# Patient Record
Sex: FEMALE | Race: WHITE | NOT HISPANIC OR LATINO | Employment: UNEMPLOYED | URBAN - METROPOLITAN AREA
[De-identification: names, ages, dates, MRNs, and addresses within clinical notes are randomized per-mention and may not be internally consistent; named-entity substitution may affect disease eponyms.]

---

## 2020-11-11 ENCOUNTER — OFFICE VISIT - HEALTHEAST (OUTPATIENT)
Dept: FAMILY MEDICINE | Facility: CLINIC | Age: 35
End: 2020-11-11

## 2020-11-11 ENCOUNTER — COMMUNICATION - HEALTHEAST (OUTPATIENT)
Dept: ADMINISTRATIVE | Facility: HOSPITAL | Age: 35
End: 2020-11-11

## 2020-11-11 DIAGNOSIS — N63.0 LUMP OR MASS IN BREAST: ICD-10-CM

## 2020-11-11 DIAGNOSIS — L73.2 HIDRADENITIS SUPPURATIVA: ICD-10-CM

## 2020-11-11 DIAGNOSIS — F17.200 CURRENT EVERY DAY SMOKER: ICD-10-CM

## 2020-11-11 DIAGNOSIS — Z84.81 FAMILY HISTORY OF BRCA GENE MUTATION: ICD-10-CM

## 2020-11-11 DIAGNOSIS — Z23 NEED FOR IMMUNIZATION AGAINST INFLUENZA: ICD-10-CM

## 2020-11-11 DIAGNOSIS — E66.3 OVERWEIGHT (BMI 25.0-29.9): ICD-10-CM

## 2020-11-11 ASSESSMENT — MIFFLIN-ST. JEOR: SCORE: 1459.52

## 2021-06-05 VITALS
HEART RATE: 82 BPM | BODY MASS INDEX: 29.15 KG/M2 | HEIGHT: 64 IN | DIASTOLIC BLOOD PRESSURE: 82 MMHG | SYSTOLIC BLOOD PRESSURE: 100 MMHG | OXYGEN SATURATION: 98 % | TEMPERATURE: 98 F | RESPIRATION RATE: 16 BRPM | WEIGHT: 170.75 LBS

## 2021-06-13 NOTE — PROGRESS NOTES
"DOYLE Kelly is a 35 y.o. female here to establish care and to discuss her breast cancer risk. She just learned that her aunt who had breast cancer has BRCA gene. Her aunt is 53. Jazmine's Mom  age 59, was diagnosed age 56 with breast cancer. She was never tested for BRCA.  Her grandmother also had breast cancer. No family history of ovarian or colon cancer.     Just got  in 2019 and states her wife has been \"on my case\" about going to the doctor.  Their marriage has been going well.  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      Still wanted to ask about 2 cystlike areas, one under each breast.  She also noticed 1 in her lower abdomen.  They seem like a pimple that popped.  These just came within the last month.      She has regular monthly periods.  She is in the middle of her cycle right now.    She does think she has put on some weight during the past year or so.  She and her wife have started going to the gym in their apartment building 3 times per week.     She smokes 1/2 ppd or a little less.  She has quit at various times, but has ended up coming back to it because it is a stress reliever for her.  She has worked in the restaurant industry for 20 years and has always gotten used to taking smoke breaks.     Past Medical History:   Diagnosis Date     Tobacco dependence      No current outpatient medications on file prior to visit.     No current facility-administered medications on file prior to visit.        Past medical, surgical, family and social history reviewed.  ?  ROS:  A complete 10 point ROS was negative for that other than the above.   ?  O  /82   Pulse 82   Temp 98  F (36.7  C) (Oral)   Resp 16   Ht 5' 4\" (1.626 m)   Wt 170 lb 12 oz (77.5 kg)   LMP 10/28/2020 (Approximate) Comment: 2 weeks ago  SpO2 98% Comment: ra  Breastfeeding No   BMI 29.31 kg/m     Vitals reviewed. Nursing note reviewed.  General Appearance: Pleasant and alert, " "in no acute distress  HEENT: mucous membranes moist  CV: RRR, no murmur, rubs, gallops  Resp: No respiratory distress. Clear to auscultation bilaterally. No wheezes, rales, rhonchi  Breast: left breast feels soft with no lumps, no nipple discharge. On right breast, there is a lumpy area at 10:00 that is slightly tender on palpation.    Abd: Soft, nontender, nondistended, bowel sounds present. No masses.  Ext: No peripheral edema, good distal perfusion  Skin: cystlike structures under each breast and one on left side of bottom of abdomen. No warmth, erythema or fluctuance. All are open, appear to have \"popped.\"  Neuro: no focal deficits, CNs II-XII normal.   Psych: mood and affect are normal.    A/P  Jazmine was seen today for cancer.    Diagnoses and all orders for this visit:    Family history of BRCA gene mutation  -     Ambulatory referral to Genetics    Lump or mass in breast: will order ultrasound given this finding.  Explained that since she is mid-cycle, she could be ovulating which could cause some breast tenderness, and the \"lumpy\" area may be benign, but especially considering her family history, we need to further evaluate  -     US Breast Right Limited 1-3 Quadrants; Future      Current every day smoker: discussed whether she is interested in quitting. She is not ready at this point.  I discussed options for Chantix, Zyban, nicotine replacement therapy.  I recommended that when she decides to quit, it would be best if she and her wife can quit together.  She states that she would like to try cold turkey again before trying anything to help her quit.    Need for immunization against influenza  -     Influenza, Seasonal Quad, PF =/> 6months      Overweight (BMI 25.0-29.9): Congratulated her on her efforts to start exercising more regularly.  Encouraged her to keep up these healthy habits and explained that losing weight would decrease her risk of breast cancer and help improve her " "hidradenitis.    Hidradenitis suppurativa: discussed diagnosis and explained that weight loss and quitting smoking would help with this.  Recommended that if she does get an area that is very red or tender, she should come in to be seen since this could be infected, and she should not \"pop\" or drain the area herself.  Advised using warm compresses to help take care of these areas.     I will follow up with her after the  Genetic testing results and the ulrasound.     Return in about 1 year (around 11/11/2021) for Annual physical.      Options for treatment and follow-up care were reviewed with the patient and/or guardian. Jazmine SWANSON Leticia and/or guardian engaged in the decision making process and verbalized understanding of the options discussed and agreed with the final plan.    Shyanne Garcia MD      "

## 2021-06-13 NOTE — PATIENT INSTRUCTIONS - HE
Patient Education     Genetic Counseling: [facility]-  [phone]    Ultrasound:   [phone].     Talk to your wife about quitting smoking and let me know if you'd like to try something to quit.     Understanding Hidradenitis Suppurativa  Hidradenitis suppurativa is a long-term (chronic) skin disease. It causes painful bumps and sores (abscesses) to form around a hair follicle. Follicles are the tiny holes from which hair grows out of your skin. The disease occurs on parts of the body where skin rubs together. It most often appears in the armpits, the groin area, and under the breasts. It's more common in women.   How to say it  [id number]  What causes hidradenitis suppurativa?  This skin disease happens when hair follicles become clogged with keratin. Keratin is the protein that makes up your hair and nails. The follicles then burst and become inflamed . Pressure or rubbing on the skin can clog the follicles. Or it can further irritate them.  The disease tends to run in families. It s also more likely to occur in people who are obese, have diabetes, or smoke. Hormones or the immune system may also play a part.  Symptoms of hidradenitis suppurativa  This skin disease causes one or more painful red bumps on the skin. These bumps become inflamed and drain pus. They may also itch or burn. In severe cases, sinus tracts may form. These are narrow channels that run under the skin. Blood or a bad-smelling pus may ooze from these bumps or sinus tracts. Bands of scarring often occur.  Treatment for hidradenitis suppurativa  Treatment for this skin disease is most successful when started early. But it may be hard to diagnose. It may be mistaken for other skin conditions. The painful bumps also often return. So stopping new bumps and limiting scarring is important. Treatment options include:    Warm compress. Putting a warm, wet washcloth on the affected skin may help.    Lifestyle  changes. Your symptoms may get better if you lose weight or stop smoking, if needed. Also avoid shaving or other irritants, such as deodorant or perfume.    Antibiotics. For mild cases, an antibiotic for the skin (topical) may help. You may need oral antibiotics if you have a severe case. They can help prevent further infection.    Other oral medicines. Over-the-counter pain medicines can ease pain and inflammation. You may need stronger medicines for a severe case. These medicines include corticosteroids or a retinoid. These may cause side effects.    Injected medicines. A steroid may be injected into the bump to ease pain. A newer biologic medicine may be injected to ease severe symptoms.    Surgery. Surgery can drain and remove the painful bumps. For severe cases, the doctor may cut out the entire area of affected skin or destroy it with a laser.  Possible complications of hidradenitis suppurativa  These include:    Arthritis    Depression    Lymphedema    Scarring of skin    Skin cancer  When to call your healthcare provider  Call your healthcare provider right away if you have any of these:    Fever of 100.4 F (38 C) or higher, or as directed by your healthcare provider    Redness, swelling, or fluid leaking from your rash that gets worse    Pain that gets worse    Symptoms that don t get better, or get worse    New symptoms  Date Last Reviewed: 5/1/2016 2000-2019 [employer]. [address] [place] [address], [place] [address]. All rights reserved. This information is not intended as a substitute for professional medical care. Always follow your healthcare professional's instructions.

## 2021-06-16 PROBLEM — F17.200 CURRENT EVERY DAY SMOKER: Status: ACTIVE | Noted: 2020-11-11

## 2021-06-16 PROBLEM — L73.2 HIDRADENITIS SUPPURATIVA: Status: ACTIVE | Noted: 2020-11-11

## 2021-06-16 PROBLEM — Z84.81 FAMILY HISTORY OF BRCA GENE MUTATION: Status: ACTIVE | Noted: 2020-11-11

## 2021-06-16 PROBLEM — E66.3 OVERWEIGHT (BMI 25.0-29.9): Status: ACTIVE | Noted: 2020-11-11

## 2022-11-01 DIAGNOSIS — F32.1 MODERATE MAJOR DEPRESSION (H): ICD-10-CM

## 2022-11-01 DIAGNOSIS — F41.1 GENERALIZED ANXIETY DISORDER: Primary | ICD-10-CM

## 2022-12-18 ENCOUNTER — APPOINTMENT (OUTPATIENT)
Dept: GENERAL RADIOLOGY | Facility: HOSPITAL | Age: 37
End: 2022-12-18
Attending: EMERGENCY MEDICINE
Payer: COMMERCIAL

## 2022-12-18 ENCOUNTER — HOSPITAL ENCOUNTER (EMERGENCY)
Facility: HOSPITAL | Age: 37
Discharge: HOME | End: 2022-12-19
Attending: EMERGENCY MEDICINE | Admitting: EMERGENCY MEDICINE
Payer: COMMERCIAL

## 2022-12-18 DIAGNOSIS — S43.005A SHOULDER DISLOCATION, LEFT, INITIAL ENCOUNTER: ICD-10-CM

## 2022-12-18 PROCEDURE — 73030 X-RAY EXAM OF SHOULDER: CPT | Mod: LT

## 2022-12-18 PROCEDURE — 250N000011 HC RX IP 250 OP 636: Performed by: EMERGENCY MEDICINE

## 2022-12-18 PROCEDURE — 250N000013 HC RX MED GY IP 250 OP 250 PS 637: Performed by: EMERGENCY MEDICINE

## 2022-12-18 PROCEDURE — 20610 DRAIN/INJ JOINT/BURSA W/O US: CPT

## 2022-12-18 PROCEDURE — 99285 EMERGENCY DEPT VISIT HI MDM: CPT | Mod: 25

## 2022-12-18 PROCEDURE — 20610 DRAIN/INJ JOINT/BURSA W/O US: CPT | Performed by: EMERGENCY MEDICINE

## 2022-12-18 RX ORDER — MORPHINE SULFATE 4 MG/ML
4 INJECTION, SOLUTION INTRAMUSCULAR; INTRAVENOUS ONCE
Status: DISCONTINUED | OUTPATIENT
Start: 2022-12-18 | End: 2022-12-18

## 2022-12-18 RX ORDER — OXYCODONE HYDROCHLORIDE 5 MG/1
5 TABLET ORAL ONCE
Status: COMPLETED | OUTPATIENT
Start: 2022-12-18 | End: 2022-12-18

## 2022-12-18 RX ORDER — ACETAMINOPHEN 325 MG/1
975 TABLET ORAL ONCE
Status: COMPLETED | OUTPATIENT
Start: 2022-12-18 | End: 2022-12-18

## 2022-12-18 RX ORDER — LORAZEPAM 2 MG/ML
1 INJECTION INTRAMUSCULAR ONCE
Status: COMPLETED | OUTPATIENT
Start: 2022-12-18 | End: 2022-12-18

## 2022-12-18 RX ADMIN — LORAZEPAM 1 MG: 2 INJECTION INTRAMUSCULAR; INTRAVENOUS at 23:51

## 2022-12-18 RX ADMIN — OXYCODONE HYDROCHLORIDE 5 MG: 5 TABLET ORAL at 23:07

## 2022-12-18 RX ADMIN — ACETAMINOPHEN 975 MG: 325 TABLET, FILM COATED ORAL at 23:07

## 2022-12-18 ASSESSMENT — ACTIVITIES OF DAILY LIVING (ADL): ADLS_ACUITY_SCORE: 33

## 2022-12-19 ENCOUNTER — APPOINTMENT (OUTPATIENT)
Dept: GENERAL RADIOLOGY | Facility: HOSPITAL | Age: 37
End: 2022-12-19
Attending: EMERGENCY MEDICINE
Payer: COMMERCIAL

## 2022-12-19 VITALS
OXYGEN SATURATION: 97 % | RESPIRATION RATE: 16 BRPM | DIASTOLIC BLOOD PRESSURE: 91 MMHG | HEART RATE: 77 BPM | TEMPERATURE: 98.1 F | SYSTOLIC BLOOD PRESSURE: 119 MMHG

## 2022-12-19 PROCEDURE — 999N000065 XR SHOULDER LEFT 2 VIEWS: Mod: LT

## 2022-12-19 ASSESSMENT — ACTIVITIES OF DAILY LIVING (ADL): ADLS_ACUITY_SCORE: 35

## 2022-12-19 NOTE — ED PROVIDER NOTES
History     Chief Complaint   Patient presents with     Shoulder Pain     HPI  Jazmine Kelly is a 37 year old female who presents with L shoulder pain. She was stretching and felt her L shoulder dislocate. This has happened before but she has always been able to get it back in place. She has some tingling in her hand. She reports severe pain with movement. No other injuries. She has had surgery on her R shoulder.     Allergies:  No Known Allergies    Problem List:    Patient Active Problem List    Diagnosis Date Noted     Family history of BRCA gene mutation 11/11/2020     Priority: Medium     Current every day smoker 11/11/2020     Priority: Medium     Overweight (BMI 25.0-29.9) 11/11/2020     Priority: Medium     Hidradenitis suppurativa 11/11/2020     Priority: Medium        Past Medical History:    Past Medical History:   Diagnosis Date     Tobacco dependence        Past Surgical History:    Past Surgical History:   Procedure Laterality Date     NO PAST SURGERIES         Family History:    Family History   Problem Relation Age of Onset     Breast Cancer Mother      No Known Problems Father      Breast Cancer Maternal Grandmother      Prostate Cancer Maternal Grandfather      Breast Cancer Maternal Aunt        Social History:  Marital Status:   [2]  Social History     Tobacco Use     Smoking status: Every Day     Types: Cigarettes     Smokeless tobacco: Never     Tobacco comments:     half pack per day   Substance Use Topics     Alcohol use: Yes     Drug use: Never        Medications:    No current outpatient medications on file.        Review of Systems  Please seen HPI for pertinent positives and negatives. All other systems reviewed and found to be negative.   Physical Exam   BP: 135/90  Pulse: 117  Temp: 98.1  F (36.7  C)  Resp: 18  SpO2: 98 %      Physical Exam  Constitutional:       General: She is in acute distress.   HENT:      Head: Normocephalic and atraumatic.      Nose: Nose normal.       Mouth/Throat:      Mouth: Mucous membranes are moist.      Pharynx: Oropharynx is clear.   Eyes:      Conjunctiva/sclera: Conjunctivae normal.      Pupils: Pupils are equal, round, and reactive to light.   Cardiovascular:      Rate and Rhythm: Normal rate and regular rhythm.      Comments: +2 radial and ulnar pulses BL. Fingers warm and well perfused with <2sec cap refill  Pulmonary:      Effort: Pulmonary effort is normal.      Breath sounds: Normal breath sounds.   Musculoskeletal:      Cervical back: Normal range of motion.      Comments:  strength and pincer grasp intact   Skin:     General: Skin is warm and dry.   Neurological:      Mental Status: She is alert and oriented to person, place, and time.      Comments: Sensation intact BL hands/fingers         ED Course              ED Course as of 12/19/22 2327   Sun Dec 18, 2022   2359 Shoulder may have spontaneously reduced. Will repeat xray     Arthrocentesis    Date/Time: 12/19/2022 11:24 PM  Performed by: Anastasiya White MD  Authorized by: Anastasiya White MD     Risks, benefits and alternatives discussed.      LOCATION:   Location:  Shoulder  ANESTHESIA (see MAR for exact dosages):   Anesthesia method:  Local infiltration  Local anesthetic:  Lidocaine 1% w/o epi      PROCEDURE DETAILS:    Needle gauge: 27 gauge.    Ultrasound guidance: no      Approach:  Posterior    Aspirate amount:  N/a    Steroid injected: no  Dressing:      PROCEDURE  Describe Procedure: Posterior approach shoulder joint injection for dislocated shoulder. 10 ml lidocaine without epinephrine injected with improvement of pain  Patient Tolerance:  Patient tolerated the procedure well with no immediate complications                Critical Care time:  none               Results for orders placed or performed during the hospital encounter of 12/18/22 (from the past 24 hour(s))   XR Shoulder Left 2 Views    Narrative    Exam: XR SHOULDER LEFT 2 VIEWS    Technique: Left shoulder, 2  views    Comparison: 12/18/2022    Exam reason: post-reduction dislocation    Findings:  Interval reduction of the left shoulder dislocation. No acute fracture  is demonstrated.    Soft tissues are normal.      Impression    Impression:  Interval reduction of the left shoulder dislocation.    ELISSA GILLESPIE MD         SYSTEM ID:  [id number]       Medications   oxyCODONE (ROXICODONE) tablet 5 mg (5 mg Oral Given 12/18/22 2307)   acetaminophen (TYLENOL) tablet 975 mg (975 mg Oral Given 12/18/22 2307)   LORazepam (ATIVAN) injection 1 mg (1 mg Intravenous Given 12/18/22 2351)       Assessments & Plan (with Medical Decision Making)     I have reviewed the nursing notes.    I have reviewed the findings, diagnosis, plan and need for follow up with the patient.   Ms Kelly is a 37-year-old woman who presents to the ED with left shoulder dislocation.  She is in significant distress, was given oxycodone, Tylenol, and then Valium IV.  I performed a joint injection and then attempted to manipulate the joint but she was unable to tolerate this.  However after a few minutes her joint spontaneously reduced.  This was confirmed on x-ray.  She was able to touch her opposite shoulder and had significant improvement of pain.  No apparent fracture on postreduction.  She was placed in a sling, recommended ibuprofen and Tylenol, close follow-up with orthopedics.  Return precautions discussed as detailed in AVS. Patient expressed understanding.     There are no discharge medications for this patient.      Final diagnoses:   Shoulder dislocation, left, initial encounter       12/18/2022   [facility]     Anastasiya White MD  12/19/22 8009

## 2022-12-19 NOTE — ED NOTES
"Patient given verbal and written discharge instructions. Patient verbalized understanding of discharge instructions. Sling placed to left arm. CMS pre and post sling intact. Skin warm. Left radial pulse present. Pt rates left shoulder pain 2/10 \"irritated. Pt's friend driving her home. Pt to follow up with orthopedics this week.     "

## 2022-12-19 NOTE — ED TRIAGE NOTES
Patient presents to emergency room with c/o left shoulder pain. Pt reports she was stretching on the couch and felt her left shoulder displace. Hx of shoulder dislocation. Last shoulder dislocation was a couple months ago. Rates left shoulder pain 9/10. Crying. Left radial pulse present. C/o numbness and tingling to left hand.

## 2022-12-19 NOTE — DISCHARGE INSTRUCTIONS
Ibuprofen 600 mg every 6 hours as needed for pain with food and plenty of water. Discontinue use after 5 days.   Tylenol 650 mg every 6 hours as needed for pain.  Do not take more than 3250 mg per day  Keep sling on at all times while you are up during the day.  Remove the sling and prop your arm with pillows at night.  Keep your arm close to her body with your hand across your trunk.  After the first 24 hours take your arm out of your sling once every hour and perform 10 gentle pendulum swings of the shoulder, 10 flexion/extension exercises of the elbow, and rotate the wrist 10 times in both directions.  This is to prevent joint stiffness/frozen shoulder  Apply ice wrapped in a towel to painful areas for 10 minutes 5-6 times/day  Follow up with orthopedics in 1 week.  Wear your sling until then.  Return to the emergency department for worsening pain, weakness/numbness, swelling/discoloration, or if you have any new or changing symptoms/concerns

## 2022-12-19 NOTE — ED NOTES
Pt reports a decrease in pain after PO Oxycodone and nerve block. Pt no longer crying. Pt reports that her left shoulder may have moved back into place while she was crying and accidentally jerked her left arm.

## 2022-12-19 NOTE — ED NOTES
Pt rates left shoulder pain 2/10. No longer crying or holding left arm. Appears relaxed and comfortable. Denies left hand numbness at this time.

## 2023-03-13 ENCOUNTER — HOSPITAL ENCOUNTER (EMERGENCY)
Facility: HOSPITAL | Age: 38
Discharge: HOME | End: 2023-03-13
Payer: COMMERCIAL

## 2023-03-13 VITALS
TEMPERATURE: 99.2 F | DIASTOLIC BLOOD PRESSURE: 69 MMHG | OXYGEN SATURATION: 97 % | RESPIRATION RATE: 18 BRPM | HEART RATE: 96 BPM | SYSTOLIC BLOOD PRESSURE: 109 MMHG

## 2023-03-13 DIAGNOSIS — J02.0 STREPTOCOCCAL PHARYNGITIS: ICD-10-CM

## 2023-03-13 DIAGNOSIS — F17.200 CURRENT EVERY DAY SMOKER: ICD-10-CM

## 2023-03-13 LAB — GROUP A STREP BY PCR: DETECTED

## 2023-03-13 PROCEDURE — 87651 STREP A DNA AMP PROBE: CPT

## 2023-03-13 PROCEDURE — 96372 THER/PROPH/DIAG INJ SC/IM: CPT

## 2023-03-13 PROCEDURE — G0463 HOSPITAL OUTPT CLINIC VISIT: HCPCS | Mod: 25

## 2023-03-13 PROCEDURE — 99213 OFFICE O/P EST LOW 20 MIN: CPT

## 2023-03-13 PROCEDURE — 250N000011 HC RX IP 250 OP 636

## 2023-03-13 RX ORDER — LAMOTRIGINE 100 MG/1
1 TABLET ORAL
COMMUNITY
Start: 2023-03-06

## 2023-03-13 RX ORDER — ESCITALOPRAM OXALATE 20 MG/1
1 TABLET ORAL
COMMUNITY
Start: 2023-03-06

## 2023-03-13 RX ADMIN — PENICILLIN G BENZATHINE 1.2 MILLION UNITS: 1200000 INJECTION, SUSPENSION INTRAMUSCULAR at 17:05

## 2023-03-13 ASSESSMENT — ENCOUNTER SYMPTOMS
SHORTNESS OF BREATH: 0
FEVER: 0
DIARRHEA: 0
VOMITING: 0
NAUSEA: 0
ACTIVITY CHANGE: 0
COUGH: 0
RHINORRHEA: 0
APPETITE CHANGE: 0
SORE THROAT: 1
CHILLS: 0
ABDOMINAL PAIN: 0

## 2023-03-13 ASSESSMENT — ACTIVITIES OF DAILY LIVING (ADL): ADLS_ACUITY_SCORE: 35

## 2023-03-13 NOTE — DISCHARGE INSTRUCTIONS
Tylenol 1000mg every 6 hours with a max dose of 4000mg daily along with ibuprofen 600-800mg every 8 hours.     Warm salt water gargles. You can also try cool liquids and teas with honey.    Return with any concerns

## 2023-03-13 NOTE — ED PROVIDER NOTES
History     Chief Complaint   Patient presents with     Pharyngitis     HPI  Jazmine Kelly is a 37 year old female who presents to the urgent care with a three day history of a sore throat. She denies n/v/d, abd pain, ear pain, and decreased appetite. No OTC meds taken today. She is a smoker. Works as a .     Allergies:  Allergies   Allergen Reactions     Lactose        Problem List:    Patient Active Problem List    Diagnosis Date Noted     Family history of BRCA gene mutation 11/11/2020     Priority: Medium     Current every day smoker 11/11/2020     Priority: Medium     Overweight (BMI 25.0-29.9) 11/11/2020     Priority: Medium     Hidradenitis suppurativa 11/11/2020     Priority: Medium        Past Medical History:    Past Medical History:   Diagnosis Date     Tobacco dependence        Past Surgical History:    Past Surgical History:   Procedure Laterality Date     NO PAST SURGERIES         Family History:    Family History   Problem Relation Age of Onset     Breast Cancer Mother      No Known Problems Father      Breast Cancer Maternal Grandmother      Prostate Cancer Maternal Grandfather      Breast Cancer Maternal Aunt        Social History:  Marital Status:   [2]  Social History     Tobacco Use     Smoking status: Every Day     Types: Cigarettes     Smokeless tobacco: Never     Tobacco comments:     half pack per day   Substance Use Topics     Alcohol use: Yes     Drug use: Never        Medications:    escitalopram (LEXAPRO) 20 MG tablet  lamoTRIgine (LAMICTAL) 100 MG tablet          Review of Systems   Constitutional: Negative for activity change, appetite change, chills and fever.   HENT: Positive for sore throat. Negative for congestion, ear pain and rhinorrhea.    Respiratory: Negative for cough and shortness of breath.    Gastrointestinal: Negative for abdominal pain, diarrhea, nausea and vomiting.   All other systems reviewed and are negative.      Physical Exam   BP: 109/69  Pulse:  96  Temp: 99.2  F (37.3  C)  Resp: 18  SpO2: 97 %      Physical Exam  Vitals and nursing note reviewed.   Constitutional:       General: She is not in acute distress.     Appearance: She is well-developed and normal weight. She is not ill-appearing.   HENT:      Right Ear: Tympanic membrane normal. No middle ear effusion. No mastoid tenderness. Tympanic membrane is not perforated or erythematous.      Left Ear: Tympanic membrane normal.  No middle ear effusion. No mastoid tenderness. Tympanic membrane is not perforated or erythematous.      Nose: No congestion or rhinorrhea.      Mouth/Throat:      Mouth: Mucous membranes are moist.      Pharynx: Uvula midline. Oropharyngeal exudate and posterior oropharyngeal erythema present. No uvula swelling.   Cardiovascular:      Rate and Rhythm: Normal rate and regular rhythm.      Pulses: Normal pulses.      Heart sounds: Normal heart sounds, S1 normal and S2 normal. No murmur heard.  Pulmonary:      Effort: Pulmonary effort is normal. No tachypnea, bradypnea or respiratory distress.      Breath sounds: Normal breath sounds. No stridor. No wheezing or rhonchi.   Lymphadenopathy:      Head:      Right side of head: Submandibular and tonsillar adenopathy present.      Left side of head: Submandibular and tonsillar adenopathy present.      Cervical: Cervical adenopathy present.      Right cervical: No superficial cervical adenopathy.     Left cervical: No superficial cervical adenopathy.   Skin:     General: Skin is warm and dry.   Neurological:      Mental Status: She is alert.         ED Course                 Procedures                Results for orders placed or performed during the hospital encounter of 03/13/23 (from the past 24 hour(s))   Group A Streptococcus PCR Throat Swab    Specimen: Throat; Swab   Result Value Ref Range    Group A strep by PCR Detected (A) Not Detected    Narrative    The Xpert Xpress Strep A test, performed on the [employer], is  a rapid, qualitative in vitro diagnostic test for the detection of Streptococcus pyogenes (Group A ß-hemolytic Streptococcus, Strep A) in throat swab specimens from patients with signs and symptoms of pharyngitis. The Xpert Xpress Strep A test can be used as an aid in the diagnosis of Group A Streptococcal pharyngitis. The assay is not intended to monitor treatment for Group A Streptococcus infections. The Xpert Xpress Strep A test utilizes an automated real-time polymerase chain reaction (PCR) to detect Streptococcus pyogenes DNA.       Medications   penicillin G benzathine (BICILLIN L-A) injection 1.2 Million Units (1.2 Million Units Intramuscular $Given 3/13/23 1709)       Assessments & Plan (with Medical Decision Making)     I have reviewed the nursing notes.    I have reviewed the findings, diagnosis, plan and need for follow up with the patient.    Jazmine Kelly is a 37 year old female who presents to the urgent care with a three day history of a sore throat. She denies n/v/d, abd pain, ear pain, and decreased appetite. No OTC meds taken today. She is a smoker. Works as a .     (J02.0) Streptococcal pharyngitis  Plan: bicillin injection given in UC. Tylenol and ibuprofen as needed for pain. Warm salt water gargles. Follow up with PCP as needed. Return with any concerns. Understanding verbalized.     MDM: Strep positive. No recent abx. Bicillin injection given.  VSS and afebrile. Lungs clear, heart tones regular. She is eating and drinking.         New Prescriptions    No medications on file       Final diagnoses:   Streptococcal pharyngitis       3/13/2023   [facility]     Samina Sargent NP  03/13/23 9395

## 2023-04-13 ENCOUNTER — APPOINTMENT (OUTPATIENT)
Dept: CT IMAGING | Facility: HOSPITAL | Age: 38
End: 2023-04-13
Attending: INTERNAL MEDICINE

## 2023-04-13 ENCOUNTER — APPOINTMENT (OUTPATIENT)
Dept: ULTRASOUND IMAGING | Facility: HOSPITAL | Age: 38
End: 2023-04-13
Attending: INTERNAL MEDICINE

## 2023-04-13 ENCOUNTER — HOSPITAL ENCOUNTER (EMERGENCY)
Facility: HOSPITAL | Age: 38
Discharge: HOME | End: 2023-04-13
Attending: INTERNAL MEDICINE | Admitting: INTERNAL MEDICINE

## 2023-04-13 VITALS
RESPIRATION RATE: 18 BRPM | WEIGHT: 167.99 LBS | DIASTOLIC BLOOD PRESSURE: 71 MMHG | TEMPERATURE: 96.3 F | BODY MASS INDEX: 28.84 KG/M2 | HEART RATE: 104 BPM | SYSTOLIC BLOOD PRESSURE: 120 MMHG | OXYGEN SATURATION: 95 %

## 2023-04-13 DIAGNOSIS — S02.2XXA CLOSED FRACTURE OF NASAL BONE, INITIAL ENCOUNTER: ICD-10-CM

## 2023-04-13 DIAGNOSIS — S10.93XA CONTUSION OF FACE, SCALP AND NECK, INITIAL ENCOUNTER: ICD-10-CM

## 2023-04-13 DIAGNOSIS — S00.83XA CONTUSION OF FACE, SCALP AND NECK, INITIAL ENCOUNTER: ICD-10-CM

## 2023-04-13 DIAGNOSIS — S00.03XA CONTUSION OF FACE, SCALP AND NECK, INITIAL ENCOUNTER: ICD-10-CM

## 2023-04-13 DIAGNOSIS — S09.90XA INJURY OF HEAD, INITIAL ENCOUNTER: ICD-10-CM
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
## 2023-04-13 PROCEDURE — 70450 CT HEAD/BRAIN W/O DYE: CPT

## 2023-04-13 PROCEDURE — 250N000013 HC RX MED GY IP 250 OP 250 PS 637: Performed by: INTERNAL MEDICINE

## 2023-04-13 PROCEDURE — 70486 CT MAXILLOFACIAL W/O DYE: CPT

## 2023-04-13 PROCEDURE — 99284 EMERGENCY DEPT VISIT MOD MDM: CPT | Mod: 25

## 2023-04-13 PROCEDURE — 99283 EMERGENCY DEPT VISIT LOW MDM: CPT | Performed by: INTERNAL MEDICINE

## 2023-04-13 RX ORDER — OXYCODONE HYDROCHLORIDE 5 MG/1
5 TABLET ORAL ONCE
Status: COMPLETED | OUTPATIENT
Start: 2023-04-13 | End: 2023-04-13

## 2023-04-13 RX ORDER — TRAZODONE HYDROCHLORIDE 50 MG/1
TABLET, FILM COATED ORAL
COMMUNITY
Start: 2023-03-06

## 2023-04-13 RX ORDER — LAMOTRIGINE 25 MG/1
TABLET ORAL
COMMUNITY
Start: 2022-11-01 | End: 2023-04-13

## 2023-04-13 RX ORDER — IBUPROFEN 600 MG/1
600 TABLET, FILM COATED ORAL ONCE
Status: COMPLETED | OUTPATIENT
Start: 2023-04-13 | End: 2023-04-13

## 2023-04-13 RX ADMIN — OXYCODONE HYDROCHLORIDE 5 MG: 5 TABLET ORAL at 05:20

## 2023-04-13 RX ADMIN — IBUPROFEN 600 MG: 600 TABLET ORAL at 04:32

## 2023-04-13 ASSESSMENT — ENCOUNTER SYMPTOMS
ANAL BLEEDING: 0
FACIAL SWELLING: 1
VOICE CHANGE: 0
PALPITATIONS: 0
FLANK PAIN: 0
NUMBNESS: 0
BLOOD IN STOOL: 0
WHEEZING: 0
NECK PAIN: 0
CHILLS: 0
ARTHRALGIAS: 0
BACK PAIN: 0
FEVER: 0
CHEST TIGHTNESS: 0
CONFUSION: 0
NECK STIFFNESS: 0
DIAPHORESIS: 0
NAUSEA: 0
COUGH: 0
VOMITING: 0
HEMATURIA: 0
LIGHT-HEADEDNESS: 0
MYALGIAS: 0
HEADACHES: 0
DIZZINESS: 0
COLOR CHANGE: 0
DYSURIA: 0
ABDOMINAL PAIN: 0
SHORTNESS OF BREATH: 0
WOUND: 0
ABDOMINAL DISTENTION: 0

## 2023-04-13 ASSESSMENT — ACTIVITIES OF DAILY LIVING (ADL): ADLS_ACUITY_SCORE: 35
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
## 2023-04-13 NOTE — ED NOTES
[facility] at bedside to speak with patient. Lab notified of need for blood draw per warrant.
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
## 2023-04-13 NOTE — ED NOTES
Discharge instructions given to patient, no questions at this time.   Encouraged patient to follow-up with her PCP &/or ENT for nasal Fx.   Verbalized understanding.   Patient ambulated out of ED to call for a ride home.

## 2024-04-08 ENCOUNTER — HOSPITAL ENCOUNTER (EMERGENCY)
Facility: HOSPITAL | Age: 39
Discharge: HOME | End: 2024-04-08
Attending: INTERNAL MEDICINE | Admitting: INTERNAL MEDICINE
Payer: COMMERCIAL

## 2024-04-08 VITALS
HEART RATE: 76 BPM | DIASTOLIC BLOOD PRESSURE: 75 MMHG | WEIGHT: 145 LBS | SYSTOLIC BLOOD PRESSURE: 119 MMHG | RESPIRATION RATE: 16 BRPM | BODY MASS INDEX: 24.75 KG/M2 | TEMPERATURE: 98.3 F | OXYGEN SATURATION: 99 % | HEIGHT: 64 IN

## 2024-04-08 DIAGNOSIS — R51.9 NONINTRACTABLE HEADACHE, UNSPECIFIED CHRONICITY PATTERN, UNSPECIFIED HEADACHE TYPE: ICD-10-CM

## 2024-04-08 LAB
ALBUMIN SERPL BCG-MCNC: 4.2 G/DL (ref 3.5–5.2)
ALP SERPL-CCNC: 49 U/L (ref 40–150)
ALT SERPL W P-5'-P-CCNC: 12 U/L (ref 0–50)
ANION GAP SERPL CALCULATED.3IONS-SCNC: 10 MMOL/L (ref 7–15)
AST SERPL W P-5'-P-CCNC: 16 U/L (ref 0–45)
BASOPHILS # BLD AUTO: 0.1 10E3/UL (ref 0–0.2)
BASOPHILS NFR BLD AUTO: 1 %
BILIRUB SERPL-MCNC: 0.7 MG/DL
BUN SERPL-MCNC: 7.7 MG/DL (ref 6–20)
CALCIUM SERPL-MCNC: 8.9 MG/DL (ref 8.6–10)
CHLORIDE SERPL-SCNC: 102 MMOL/L (ref 98–107)
COHGB MFR BLD: 2 % (ref 0–2)
CREAT SERPL-MCNC: 0.54 MG/DL (ref 0.51–0.95)
DEPRECATED HCO3 PLAS-SCNC: 25 MMOL/L (ref 22–29)
EGFRCR SERPLBLD CKD-EPI 2021: >90 ML/MIN/1.73M2
EOSINOPHIL # BLD AUTO: 0.1 10E3/UL (ref 0–0.7)
EOSINOPHIL NFR BLD AUTO: 2 %
ERYTHROCYTE [DISTWIDTH] IN BLOOD BY AUTOMATED COUNT: 12.2 % (ref 10–15)
FLUAV RNA SPEC QL NAA+PROBE: NEGATIVE
FLUBV RNA RESP QL NAA+PROBE: NEGATIVE
GLUCOSE SERPL-MCNC: 88 MG/DL (ref 70–99)
HCT VFR BLD AUTO: 35 % (ref 35–47)
HGB BLD-MCNC: 12 G/DL (ref 11.7–15.7)
HOLD SPECIMEN: NORMAL
HOLD SPECIMEN: NORMAL
IMM GRANULOCYTES # BLD: 0 10E3/UL
IMM GRANULOCYTES NFR BLD: 0 %
LYMPHOCYTES # BLD AUTO: 2.3 10E3/UL (ref 0.8–5.3)
LYMPHOCYTES NFR BLD AUTO: 30 %
MCH RBC QN AUTO: 30.5 PG (ref 26.5–33)
MCHC RBC AUTO-ENTMCNC: 34.3 G/DL (ref 31.5–36.5)
MCV RBC AUTO: 89 FL (ref 78–100)
MONOCYTES # BLD AUTO: 0.5 10E3/UL (ref 0–1.3)
MONOCYTES NFR BLD AUTO: 7 %
NEUTROPHILS # BLD AUTO: 4.7 10E3/UL (ref 1.6–8.3)
NEUTROPHILS NFR BLD AUTO: 61 %
NRBC # BLD AUTO: 0 10E3/UL
NRBC BLD AUTO-RTO: 0 /100
PLATELET # BLD AUTO: 241 10E3/UL (ref 150–450)
POTASSIUM SERPL-SCNC: 3.3 MMOL/L (ref 3.4–5.3)
PROT SERPL-MCNC: 6.7 G/DL (ref 6.4–8.3)
RBC # BLD AUTO: 3.94 10E6/UL (ref 3.8–5.2)
RSV RNA SPEC NAA+PROBE: NEGATIVE
SARS-COV-2 RNA RESP QL NAA+PROBE: NEGATIVE
SODIUM SERPL-SCNC: 137 MMOL/L (ref 135–145)
WBC # BLD AUTO: 7.6 10E3/UL (ref 4–11)

## 2024-04-08 PROCEDURE — 99284 EMERGENCY DEPT VISIT MOD MDM: CPT

## 2024-04-08 PROCEDURE — 85025 COMPLETE CBC W/AUTO DIFF WBC: CPT | Performed by: INTERNAL MEDICINE

## 2024-04-08 PROCEDURE — 99283 EMERGENCY DEPT VISIT LOW MDM: CPT | Performed by: INTERNAL MEDICINE

## 2024-04-08 PROCEDURE — 80053 COMPREHEN METABOLIC PANEL: CPT | Performed by: INTERNAL MEDICINE

## 2024-04-08 PROCEDURE — 96372 THER/PROPH/DIAG INJ SC/IM: CPT | Performed by: INTERNAL MEDICINE

## 2024-04-08 PROCEDURE — 36415 COLL VENOUS BLD VENIPUNCTURE: CPT | Performed by: INTERNAL MEDICINE

## 2024-04-08 PROCEDURE — 82375 ASSAY CARBOXYHB QUANT: CPT | Performed by: INTERNAL MEDICINE

## 2024-04-08 PROCEDURE — 87637 SARSCOV2&INF A&B&RSV AMP PRB: CPT | Performed by: INTERNAL MEDICINE

## 2024-04-08 PROCEDURE — 250N000011 HC RX IP 250 OP 636: Performed by: INTERNAL MEDICINE

## 2024-04-08 RX ORDER — DIPHENHYDRAMINE HYDROCHLORIDE 50 MG/ML
50 INJECTION INTRAMUSCULAR; INTRAVENOUS ONCE
Status: COMPLETED | OUTPATIENT
Start: 2024-04-08 | End: 2024-04-08

## 2024-04-08 RX ADMIN — DIPHENHYDRAMINE HYDROCHLORIDE 50 MG: 50 INJECTION, SOLUTION INTRAMUSCULAR; INTRAVENOUS at 02:12

## 2024-04-08 RX ADMIN — PROCHLORPERAZINE EDISYLATE 10 MG: 5 INJECTION INTRAMUSCULAR; INTRAVENOUS at 02:11

## 2024-04-08 ASSESSMENT — ACTIVITIES OF DAILY LIVING (ADL)
ADLS_ACUITY_SCORE: 33
ADLS_ACUITY_SCORE: 33

## 2024-04-08 ASSESSMENT — COLUMBIA-SUICIDE SEVERITY RATING SCALE - C-SSRS
1. IN THE PAST MONTH, HAVE YOU WISHED YOU WERE DEAD OR WISHED YOU COULD GO TO SLEEP AND NOT WAKE UP?: NO
2. HAVE YOU ACTUALLY HAD ANY THOUGHTS OF KILLING YOURSELF IN THE PAST MONTH?: NO
6. HAVE YOU EVER DONE ANYTHING, STARTED TO DO ANYTHING, OR PREPARED TO DO ANYTHING TO END YOUR LIFE?: NO
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
## 2024-04-08 NOTE — ED NOTES
Patient is discharging to home given information on Headache. Patient stated understanding of these instructions and is discharging by private auto.

## 2024-04-09 ASSESSMENT — ENCOUNTER SYMPTOMS
COLOR CHANGE: 0
FLANK PAIN: 0
ANAL BLEEDING: 0
COUGH: 0
BACK PAIN: 0
HEMATURIA: 0
NUMBNESS: 0
ARTHRALGIAS: 0
DIZZINESS: 0
DYSURIA: 0
VOMITING: 1
NAUSEA: 1
WHEEZING: 0
NECK STIFFNESS: 0
FEVER: 0
SHORTNESS OF BREATH: 0
FATIGUE: 1
HEADACHES: 1
CHEST TIGHTNESS: 0
CONFUSION: 0
LIGHT-HEADEDNESS: 0
MYALGIAS: 0
DIAPHORESIS: 0
WOUND: 0
CHILLS: 0
NECK PAIN: 0
PALPITATIONS: 0
VOICE CHANGE: 0
BLOOD IN STOOL: 0
ABDOMINAL PAIN: 0
ABDOMINAL DISTENTION: 0

## 2024-04-09 NOTE — ED PROVIDER NOTES
History     Chief Complaint   Patient presents with    Nausea & Vomiting    Headache    Chills     The history is provided by the patient.   Headache  Pain location:  Generalized  Quality:  Dull  Severity currently:  4/10  Onset quality:  Gradual  Duration:  2 days  Timing:  Intermittent  Chronicity:  New  Associated symptoms: fatigue, nausea and vomiting    Associated symptoms: no abdominal pain, no back pain, no cough, no dizziness, no fever, no myalgias, no neck pain, no neck stiffness and no numbness      Allergies:  Allergies   Allergen Reactions    Lactose        Problem List:    Patient Active Problem List    Diagnosis Date Noted    Family history of BRCA gene mutation 11/11/2020     Priority: Medium    Current every day smoker 11/11/2020     Priority: Medium    Overweight (BMI 25.0-29.9) 11/11/2020     Priority: Medium    Hidradenitis suppurativa 11/11/2020     Priority: Medium        Past Medical History:    Past Medical History:   Diagnosis Date    Tobacco dependence        Past Surgical History:    Past Surgical History:   Procedure Laterality Date    NO PAST SURGERIES         Family History:    Family History   Problem Relation Age of Onset    Breast Cancer Mother     No Known Problems Father     Breast Cancer Maternal Grandmother     Prostate Cancer Maternal Grandfather     Breast Cancer Maternal Aunt        Social History:  Marital Status:   [2]  Social History     Tobacco Use    Smoking status: Every Day     Types: Cigarettes    Smokeless tobacco: Never    Tobacco comments:     half pack per day   Substance Use Topics    Alcohol use: Yes    Drug use: Never        Medications:    escitalopram (LEXAPRO) 20 MG tablet  lamoTRIgine (LAMICTAL) 100 MG tablet  traZODone (DESYREL) 50 MG tablet          Review of Systems   Constitutional:  Positive for fatigue. Negative for chills, diaphoresis and fever.   HENT:  Positive for sneezing. Negative for voice change.    Eyes:  Negative for visual  "disturbance.   Respiratory:  Negative for cough, chest tightness, shortness of breath and wheezing.    Cardiovascular:  Negative for chest pain, palpitations and leg swelling.   Gastrointestinal:  Positive for nausea and vomiting. Negative for abdominal distention, abdominal pain, anal bleeding and blood in stool.   Genitourinary:  Negative for decreased urine volume, dysuria, flank pain and hematuria.   Musculoskeletal:  Negative for arthralgias, back pain, gait problem, myalgias, neck pain and neck stiffness.   Skin:  Negative for color change, pallor, rash and wound.   Neurological:  Positive for headaches. Negative for dizziness, syncope, light-headedness and numbness.   Psychiatric/Behavioral:  Negative for confusion and suicidal ideas.        Physical Exam   BP: 119/75  Pulse: 76  Temp: 98.3  F (36.8  C)  Resp: 16  Height: 162.6 cm (5' 4\")  Weight: 65.8 kg (145 lb)  SpO2: 100 %      Physical Exam  Constitutional:       General: She is not in acute distress.     Appearance: Normal appearance. She is not diaphoretic.   HENT:      Head: Atraumatic.      Mouth/Throat:      Mouth: Mucous membranes are moist.   Eyes:      General: No scleral icterus.     Conjunctiva/sclera: Conjunctivae normal.   Cardiovascular:      Rate and Rhythm: Normal rate.      Heart sounds: Normal heart sounds.   Pulmonary:      Effort: No respiratory distress.      Breath sounds: Normal breath sounds.   Abdominal:      General: Abdomen is flat.   Musculoskeletal:      Cervical back: Neck supple.   Skin:     General: Skin is warm.      Findings: No rash.   Neurological:      Mental Status: She is alert.         ED Course        Procedures                  No results found for this or any previous visit (from the past 24 hour(s)).    Medications   prochlorperazine (COMPAZINE) injection 10 mg (10 mg Intramuscular $Given 4/8/24 0211)   diphenhydrAMINE (BENADRYL) injection 50 mg (50 mg Intramuscular $Given 4/8/24 0212)       Assessments & Plan " (with Medical Decision Making)   Headache, nausea , vomiting  Symptoms improved with compzine + diphenhydratmine  But still has some residual headache that pt believes can be resolved by taking tylenol at home and does not want any further workup  I advised her to return to ER if symptoms persisted or got worst  She voiced understanding and agreed.   I have reviewed the nursing notes.    I have reviewed the findings, diagnosis, plan and need for follow up with the patient.          Discharge Medication List as of 4/8/2024  3:23 AM          Final diagnoses:   Nonintractable headache, unspecified chronicity pattern, unspecified headache type       4/8/2024   [facility]       Jad Matta MD  04/09/24 0315     Detail Level: Detailed Size Of Lesion In Cm (Optional): 0.5 X Size Of Lesion In Cm (Optional): 0.6 Size Of Lesion In Cm (Optional): 1.6 X Size Of Lesion In Cm (Optional): 1